# Patient Record
Sex: FEMALE | Race: WHITE | NOT HISPANIC OR LATINO | URBAN - NONMETROPOLITAN AREA
[De-identification: names, ages, dates, MRNs, and addresses within clinical notes are randomized per-mention and may not be internally consistent; named-entity substitution may affect disease eponyms.]

---

## 2017-11-20 ENCOUNTER — TRANSCRIBE ORDERS (OUTPATIENT)
Dept: PODIATRY | Facility: CLINIC | Age: 67
End: 2017-11-20

## 2017-11-20 DIAGNOSIS — E11.9 ENCOUNTER FOR DIABETIC FOOT EXAM (HCC): Primary | ICD-10-CM

## 2018-01-22 ENCOUNTER — OFFICE VISIT (OUTPATIENT)
Dept: PODIATRY | Facility: CLINIC | Age: 68
End: 2018-01-22

## 2018-01-22 VITALS — BODY MASS INDEX: 43.34 KG/M2 | WEIGHT: 215 LBS | HEIGHT: 59 IN

## 2018-01-22 DIAGNOSIS — M76.829 PTTD (POSTERIOR TIBIAL TENDON DYSFUNCTION): ICD-10-CM

## 2018-01-22 DIAGNOSIS — M79.674 PAIN IN TOES OF BOTH FEET: ICD-10-CM

## 2018-01-22 DIAGNOSIS — M20.41 HAMMER TOES OF BOTH FEET: ICD-10-CM

## 2018-01-22 DIAGNOSIS — M79.675 PAIN IN TOES OF BOTH FEET: ICD-10-CM

## 2018-01-22 DIAGNOSIS — B35.1 ONYCHOMYCOSIS: Primary | ICD-10-CM

## 2018-01-22 DIAGNOSIS — L85.3 XEROSIS CUTIS: ICD-10-CM

## 2018-01-22 DIAGNOSIS — E11.8 DIABETIC FOOT (HCC): ICD-10-CM

## 2018-01-22 DIAGNOSIS — M20.42 HAMMER TOES OF BOTH FEET: ICD-10-CM

## 2018-01-22 PROCEDURE — 99214 OFFICE O/P EST MOD 30 MIN: CPT | Performed by: PODIATRIST

## 2018-01-22 PROCEDURE — 11721 DEBRIDE NAIL 6 OR MORE: CPT | Performed by: PODIATRIST

## 2018-01-22 NOTE — PROGRESS NOTES
Nela Castillo  1950  67 y.o. female   PCP: Tatyana Buchanan, APRN 11-20-17  BS: Patient doesn't take Blood Sugar    01/22/2018  Chief Complaint   Patient presents with   • Left Foot - Diabetic foot exam   • Right Foot - Diabetic foot exam           History of Present Illness    Nela Castillo is a 67 y.o. female who presents for diabetic foot exam and multiple complaints.  She states that her nails have become thickened and dystrophic, they do intermittently grow into her skin cause pain.  She also complains of chronic peeling cracked skin.  She does also note pain to the instep of both feet with weightbearing.  She was previously seen by Dr. Holley in 2015 for an unreal ria issue.            Past Medical History:   Diagnosis Date   • Bunion    • Callus    • Depression    • Diabetes mellitus    • GERD (gastroesophageal reflux disease)    • Hypertension          Past Surgical History:   Procedure Laterality Date   • FOOT SURGERY      had a knot removed    • NECK SURGERY      had a lump removed    • TONSILLECTOMY           Family History   Problem Relation Age of Onset   • Hypertension Mother    • Thyroid disease Mother    • Diabetes Father    • Heart failure Father    • Heart disease Father    • Diabetes Brother    • Heart disease Maternal Uncle    • Diabetes Paternal Uncle          Social History     Social History   • Marital status:      Spouse name: N/A   • Number of children: N/A   • Years of education: N/A     Occupational History   • Not on file.     Social History Main Topics   • Smoking status: Never Smoker   • Smokeless tobacco: Not on file   • Alcohol use No   • Drug use: No   • Sexual activity: Not on file     Other Topics Concern   • Not on file     Social History Narrative         Current Outpatient Prescriptions   Medication Sig Dispense Refill   • FLUoxetine HCl (PROZAC PO) Take  by mouth. Pt doesn't know mg     • METFORMIN HCL PO Take  by mouth. Pt doesn't know mg     • NON  "FORMULARY Pt takes several meds doesn't have list with her and cant remember the names of all meds       No current facility-administered medications for this visit.          OBJECTIVE    Ht 149.9 cm (59\")  Wt 97.5 kg (215 lb)  BMI 43.42 kg/m2      Review of Systems   Constitutional: Positive for fatigue.   HENT: Positive for hearing loss and trouble swallowing.    Eyes: Positive for visual disturbance.   Respiratory: Negative.    Cardiovascular: Positive for palpitations and leg swelling.        Arterial fib   Gastrointestinal: Positive for constipation.   Endocrine: Negative.    Genitourinary: Positive for hematuria.   Musculoskeletal: Positive for back pain.        Foot pain   Skin: Positive for color change.   Allergic/Immunologic: Negative.    Neurological: Positive for numbness.   Hematological: Negative.    Psychiatric/Behavioral: The patient is nervous/anxious.         Depression         Physical Exam    Nela had a diabetic foot exam performed today.      Constitutional: she appears well-developed and well-nourished.   HEENT: Normocephalic. Atraumatic.  CV: No CP. RRR  Resp: Non-labored respirations.  Psychiatric: she has a normal mood and affect. her behavior is normal.         Lower Extremity Exam:  Vascular: DP/PT pulses palpable 2+.   Mild ankle edema, b/l  Foot warm  Neuro: Protective sensation intact, b/l.  DTRs intact  Negative Tinel over tarsal tunnel  Integument: No open wounds or lesions.  No erythema  Diffuse xerosis to bilateral feet  No masses  Lungs 1 through 5 bilateral thickened, elongated and dystrophic with subungual debris.  Positive tenderness to palpation.  Musculoskeletal: LE muscle strength 4/5.   Can perform double heel rise  Gait normal  Ankle ROM decreased without pain or crepitus  STJ ROM full without pain or crepitus  Pain on palpation of Posterior tibial tendon just distal to medial malleolus, b/l              ASSESSMENT AND PLAN    Nela was seen today for diabetic foot " exam and diabetic foot exam.    Diagnoses and all orders for this visit:    Onychomycosis    Pain in toes of both feet    Diabetic foot    Hammer toes of both feet    Xerosis cutis    PTTD (posterior tibial tendon dysfunction)    -Comprehensive DM foot exam performed. Pt educated on importance of tight glucose control and daily foot checks. Pt education materials dispensed.  -Pt educated on padding techniques for rigid hammertoes.  Proper extra depth diabetic shoe gear.  Limit barefoot walking.  -Nails 1-5 b/l debrided in length and thickness with nail nipper to decrease pain, fungal load and risk of infection  -AmLactin for xerosis  -Will refer to physical therapy for bilateral PTTD.  -Follow up 3 months PRN          This document has been electronically signed by Shekhar Poe DPM on January 24, 2018 3:56 PM     EMR Dragon/Transcription disclaimer:   Much of this encounter note is an electronic transcription/translation of spoken language to printed text. The electronic translation of spoken language may permit erroneous, or at times, nonsensical words or phrases to be inadvertently transcribed; Although I have reviewed the note for such errors, some may still exist.    Shekhar Poe DPM  1/24/2018  3:56 PM

## 2018-09-11 ENCOUNTER — OFFICE VISIT (OUTPATIENT)
Dept: OTOLARYNGOLOGY | Facility: CLINIC | Age: 68
End: 2018-09-11

## 2018-09-11 VITALS
BODY MASS INDEX: 42.74 KG/M2 | SYSTOLIC BLOOD PRESSURE: 110 MMHG | DIASTOLIC BLOOD PRESSURE: 70 MMHG | WEIGHT: 212 LBS | HEIGHT: 59 IN

## 2018-09-11 DIAGNOSIS — R42 VERTIGO: Primary | ICD-10-CM

## 2018-09-11 PROCEDURE — 99203 OFFICE O/P NEW LOW 30 MIN: CPT | Performed by: OTOLARYNGOLOGY

## 2018-09-11 RX ORDER — ZINC GLUCONATE 50 MG
50 TABLET ORAL
COMMUNITY

## 2018-09-11 RX ORDER — CYCLOSPORINE 0.5 MG/ML
1 EMULSION OPHTHALMIC
COMMUNITY

## 2018-09-11 RX ORDER — ERGOCALCIFEROL 1.25 MG/1
50000 CAPSULE ORAL
COMMUNITY

## 2018-09-11 RX ORDER — DILTIAZEM HYDROCHLORIDE 180 MG/1
180 CAPSULE, COATED, EXTENDED RELEASE ORAL
COMMUNITY

## 2018-09-11 RX ORDER — IBUPROFEN 200 MG
200 TABLET ORAL
COMMUNITY

## 2018-09-11 RX ORDER — NITROGLYCERIN 0.4 MG/1
0.4 TABLET SUBLINGUAL
COMMUNITY

## 2018-09-11 RX ORDER — OMEPRAZOLE 20 MG/1
20 CAPSULE, DELAYED RELEASE ORAL
COMMUNITY

## 2018-09-11 RX ORDER — PILOCARPINE HYDROCHLORIDE 5 MG/1
5 TABLET, FILM COATED ORAL
COMMUNITY
Start: 2016-08-09

## 2018-09-13 NOTE — PROGRESS NOTES
Subjective   Nela Castillo is a 68 y.o. female.   This is a consultation from Tatyana JOHNSON  History of Present Illness   Patient reports that several months ago she began developing spinning dizziness.  This would primarily occur when she would lie down and turn her head to the side, usually to the right.  Other times it would happen when she would bend over and then looked back up or look up at something above her head.  She has long-standing profound binaural hearing loss and is not noticed any subjective change in her hearing.  She is not aware of anything that brought this on.  She has discovered that visual fixation will result in more rapid resolution of her symptoms.  She says the symptoms will last for at most a few minutes sometimes less.  She states that actually the vertigo has resolved for the last 2 days.      The following portions of the patient's history were reviewed and updated as appropriate: allergies, current medications, past family history, past medical history, past social history, past surgical history and problem list.      Nela Castillo reports that she has never smoked. She has never used smokeless tobacco. She reports that she does not drink alcohol or use drugs.  Patient is not a tobacco user and has not been counseled for use of tobacco products    Family History   Problem Relation Age of Onset   • Hypertension Mother    • Thyroid disease Mother    • Diabetes Father    • Heart failure Father    • Heart disease Father    • Diabetes Brother    • Heart disease Maternal Uncle    • Diabetes Paternal Uncle        No Known Allergies      Current Outpatient Prescriptions:   •  pilocarpine (SALAGEN) 5 MG tablet, Take 5 mg by mouth., Disp: , Rfl:   •  Alpha-Lipoic Acid 100 MG capsule, Take 1 capsule by mouth., Disp: , Rfl:   •  aspirin 81 MG tablet, Take 81 mg by mouth., Disp: , Rfl:   •  cycloSPORINE (RESTASIS) 0.05 % ophthalmic emulsion, 1 drop., Disp: , Rfl:   •  diltiaZEM  CD (DILTIAZEM CD) 180 MG 24 hr capsule, Take 180 mg by mouth., Disp: , Rfl:   •  ergocalciferol (ERGOCALCIFEROL) 51741 units capsule, Take 50,000 Units by mouth., Disp: , Rfl:   •  FLUoxetine HCl (PROZAC PO), Take  by mouth. Pt doesn't know mg, Disp: , Rfl:   •  ibuprofen (ADVIL,MOTRIN) 200 MG tablet, Take 200 mg by mouth., Disp: , Rfl:   •  magnesium oxide 250 MG tablet, Take 250 mcg by mouth., Disp: , Rfl:   •  metFORMIN (GLUCOPHAGE) 500 MG tablet, Take 500 mg by mouth., Disp: , Rfl:   •  METFORMIN HCL PO, Take  by mouth. Pt doesn't know mg, Disp: , Rfl:   •  nitroglycerin (NITROSTAT) 0.4 MG SL tablet, Place 0.4 mg under the tongue., Disp: , Rfl:   •  NON FORMULARY, Pt takes several meds doesn't have list with her and cant remember the names of all meds, Disp: , Rfl:   •  Omega-3 Fatty Acids (FISH OIL PO), Take  by mouth., Disp: , Rfl:   •  omeprazole (priLOSEC) 20 MG capsule, Take 20 mg by mouth., Disp: , Rfl:   •  Zinc 50 MG tablet, Take 50 tablets by mouth., Disp: , Rfl:     Past Medical History:   Diagnosis Date   • Atrial fibrillation (CMS/HCC)    • Bunion    • Callus    • Depression    • Diabetes (CMS/HCC)    • Diabetes mellitus (CMS/HCC)    • GERD (gastroesophageal reflux disease)    • Hypertension          Review of Systems   HENT: Positive for hearing loss.    Cardiovascular: Positive for palpitations.   Gastrointestinal: Positive for diarrhea.   Genitourinary: Positive for hematuria.        Urinary incontinence   Neurological: Positive for numbness.   Psychiatric/Behavioral:        Mood changes; depression   All other systems reviewed and are negative.          Objective   Physical Exam  General: Well-developed well-nourished female in no acute distress.  Alert and oriented ×3. Head: Normocephalic. Face: Symmetrical strength and appearance. PERRL. EOMI. Voice:Strong. Speech: Inflection consistent with long-standing hearing loss  Ears: External ears no deformity, canals no discharge, tympanic membranes  intact, right tympanic membrane has tympanosclerosis left tympanic membrane is clear.  No infection or effusion.  Patient is grossly hard of hearing  Nose: Nares show no discharge mass polyp or purulence.  Boggy mucosa is present.  No gross external deformity.  Septum: Midline  Oral cavity: Lips and gums without lesions.  Tongue and floor of mouth without lesions.  Parotid and submandibular ducts unobstructed.  No mucosal lesions on the buccal mucosa or vestibule of the mouth.  Pharynx: No erythema exudate mass or ulcer  Neck: No lymphadenopathy.  No thyromegaly.  Trachea and larynx midline.  No masses in the parotid or submandibular glands.  Drumright-Hallpike maneuvers produced no vertigo and no nystagmus      Assessment/Plan   Nela was seen today for dizziness.    Diagnoses and all orders for this visit:    Vertigo    Plan: By history the patient likely had benign paroxysmal positional vertigo.  The fact that her vertigo cannot be re-created today suggests that the otoliths are currently not mobile.  I would not recommend any specific treatment at this point but I ask her to call right away if her symptoms recur and I will try to see her while she is symptomatic    My thanks to Ms. Buchanan for this consultation